# Patient Record
Sex: MALE | Race: WHITE | NOT HISPANIC OR LATINO | URBAN - METROPOLITAN AREA
[De-identification: names, ages, dates, MRNs, and addresses within clinical notes are randomized per-mention and may not be internally consistent; named-entity substitution may affect disease eponyms.]

---

## 2024-07-25 ENCOUNTER — TELEPHONE (OUTPATIENT)
Dept: SURGICAL ONCOLOGY | Facility: HOSPITAL | Age: 84
End: 2024-07-25

## 2024-07-25 DIAGNOSIS — I35.1 AORTIC VALVE INSUFFICIENCY, ETIOLOGY OF CARDIAC VALVE DISEASE UNSPECIFIED: ICD-10-CM

## 2024-07-25 DIAGNOSIS — I77.810 AORTIC ROOT DILATION (CMS-HCC): ICD-10-CM

## 2024-07-25 DIAGNOSIS — I77.819 DILATATION OF AORTA (CMS-HCC): ICD-10-CM

## 2024-08-12 ENCOUNTER — HOSPITAL ENCOUNTER (OUTPATIENT)
Dept: CARDIOLOGY | Facility: CLINIC | Age: 84
Discharge: HOME | End: 2024-08-12
Payer: MEDICARE

## 2024-08-12 DIAGNOSIS — I35.1 AORTIC VALVE INSUFFICIENCY, ETIOLOGY OF CARDIAC VALVE DISEASE UNSPECIFIED: ICD-10-CM

## 2024-08-12 DIAGNOSIS — I77.810 AORTIC ROOT DILATION (CMS-HCC): ICD-10-CM

## 2024-08-12 DIAGNOSIS — I77.819 DILATATION OF AORTA (CMS-HCC): ICD-10-CM

## 2024-08-12 PROCEDURE — 93306 TTE W/DOPPLER COMPLETE: CPT

## 2024-08-12 PROCEDURE — 93306 TTE W/DOPPLER COMPLETE: CPT | Performed by: INTERNAL MEDICINE

## 2024-08-13 LAB
AORTIC VALVE MEAN GRADIENT: 3 MMHG
AORTIC VALVE PEAK VELOCITY: 1.16 M/S
AV PEAK GRADIENT: 5.4 MMHG
AVA (PEAK VEL): 3.7 CM2
AVA (VTI): 3.83 CM2
EJECTION FRACTION APICAL 4 CHAMBER: 66.1
EJECTION FRACTION: 60 %
LEFT ATRIUM VOLUME AREA LENGTH INDEX BSA: 33.5 ML/M2
LEFT VENTRICLE INTERNAL DIMENSION DIASTOLE: 4.9 CM (ref 3.5–6)
LEFT VENTRICULAR OUTFLOW TRACT DIAMETER: 2.4 CM
MITRAL VALVE E/A RATIO: 0.99
RIGHT VENTRICLE FREE WALL PEAK S': 11.7 CM/S

## 2024-08-22 ENCOUNTER — TELEPHONE (OUTPATIENT)
Dept: CARDIAC SURGERY | Facility: HOSPITAL | Age: 84
End: 2024-08-22

## 2024-09-05 NOTE — TELEPHONE ENCOUNTER
Called 4x  to schedule , no answer and no vmail. Called Dr. Ford office and spoke with Monserrat about not being able to reach the patient.

## 2024-09-10 ENCOUNTER — TELEPHONE (OUTPATIENT)
Dept: CARDIAC SURGERY | Facility: HOSPITAL | Age: 84
End: 2024-09-10

## 2024-09-27 ENCOUNTER — APPOINTMENT (OUTPATIENT)
Dept: CARDIAC SURGERY | Facility: HOSPITAL | Age: 84
End: 2024-09-27
Payer: MEDICARE

## 2024-10-04 ENCOUNTER — APPOINTMENT (OUTPATIENT)
Dept: CARDIAC SURGERY | Facility: HOSPITAL | Age: 84
End: 2024-10-04
Payer: MEDICARE

## 2024-10-17 PROBLEM — D68.318 HEMORRHAGIC DISORDER DUE TO CIRCULATING ANTICOAGULANTS: Status: RESOLVED | Noted: 2024-10-17 | Resolved: 2024-10-17

## 2024-10-17 PROBLEM — I25.10 CAD IN NATIVE ARTERY: Status: ACTIVE | Noted: 2023-07-11

## 2024-10-17 PROBLEM — G47.33 OBSTRUCTIVE SLEEP APNEA SYNDROME IN ADULT: Status: ACTIVE | Noted: 2024-10-17

## 2024-10-17 PROBLEM — M51.16 LUMBAR DISC DISEASE WITH RADICULOPATHY: Status: RESOLVED | Noted: 2024-10-17 | Resolved: 2024-10-17

## 2024-10-17 PROBLEM — I87.2 PERIPHERAL VENOUS INSUFFICIENCY: Status: ACTIVE | Noted: 2024-10-17

## 2024-10-17 PROBLEM — C44.90 MALIGNANT NEOPLASM OF SKIN: Status: RESOLVED | Noted: 2024-10-17 | Resolved: 2024-10-17

## 2024-10-17 PROBLEM — R58 HEMORRHAGE: Status: ACTIVE | Noted: 2024-10-17

## 2024-10-17 PROBLEM — R09.02 HYPOXEMIA: Status: RESOLVED | Noted: 2023-07-11 | Resolved: 2024-10-17

## 2024-10-17 PROBLEM — S36.90XA: Status: RESOLVED | Noted: 2023-07-11 | Resolved: 2024-10-17

## 2024-10-17 PROBLEM — E66.01 MORBID OBESITY (MULTI): Status: ACTIVE | Noted: 2023-07-11

## 2024-10-17 PROBLEM — I71.21 ASCENDING AORTIC ANEURYSM (CMS-HCC): Status: RESOLVED | Noted: 2024-10-17 | Resolved: 2024-10-17

## 2024-10-17 PROBLEM — I89.0 LYMPHEDEMA: Status: ACTIVE | Noted: 2024-10-17

## 2024-10-17 PROBLEM — R31.9 HEMATURIA: Status: RESOLVED | Noted: 2022-07-09 | Resolved: 2024-10-17

## 2024-10-17 PROBLEM — E78.00 HYPERCHOLESTEROLEMIA: Status: ACTIVE | Noted: 2024-10-17

## 2024-10-17 PROBLEM — I27.21 PULMONARY ARTERIAL HYPERTENSION (MULTI): Status: ACTIVE | Noted: 2024-10-17

## 2024-10-17 PROBLEM — I11.9 HYPERTENSIVE HEART DISEASE: Status: RESOLVED | Noted: 2024-10-17 | Resolved: 2024-10-17

## 2024-10-17 PROBLEM — I10 HYPERTENSIVE DISORDER: Status: ACTIVE | Noted: 2024-10-17

## 2024-10-17 PROBLEM — R60.0 EDEMA OF LOWER EXTREMITY: Status: RESOLVED | Noted: 2024-10-17 | Resolved: 2024-10-17

## 2024-10-17 PROBLEM — S32.029A CLOSED FRACTURE OF SECOND LUMBAR VERTEBRA (MULTI): Status: RESOLVED | Noted: 2023-07-11 | Resolved: 2024-10-17

## 2024-10-17 PROBLEM — M54.9 BACK PAIN: Status: RESOLVED | Noted: 2022-07-09 | Resolved: 2024-10-17

## 2024-10-17 PROBLEM — G47.30 SLEEP APNEA: Status: ACTIVE | Noted: 2024-10-17

## 2024-10-17 PROBLEM — I48.91 ATRIAL FIBRILLATION (MULTI): Status: ACTIVE | Noted: 2023-07-11

## 2024-10-17 PROBLEM — D68.59 HYPERCOAGULABLE STATE (MULTI): Status: ACTIVE | Noted: 2024-10-17

## 2024-10-17 PROBLEM — M19.90 ARTHRITIS: Status: RESOLVED | Noted: 2024-10-17 | Resolved: 2024-10-17

## 2024-10-17 RX ORDER — TAMSULOSIN HYDROCHLORIDE 0.4 MG/1
1 CAPSULE ORAL
COMMUNITY
Start: 2023-10-28

## 2024-10-17 RX ORDER — NITROGLYCERIN 0.4 MG/1
0.4 TABLET SUBLINGUAL EVERY 5 MIN PRN
COMMUNITY
Start: 2024-06-28

## 2024-10-17 RX ORDER — CHLORTHALIDONE 25 MG/1
25 TABLET ORAL DAILY
COMMUNITY
Start: 2024-07-01

## 2024-10-17 RX ORDER — ENALAPRIL MALEATE 20 MG/1
20 TABLET ORAL DAILY
COMMUNITY
Start: 2024-06-27

## 2024-10-17 RX ORDER — METOPROLOL SUCCINATE 25 MG/1
25 TABLET, EXTENDED RELEASE ORAL DAILY
COMMUNITY

## 2024-10-17 RX ORDER — ATORVASTATIN CALCIUM 40 MG/1
40 TABLET, FILM COATED ORAL DAILY
COMMUNITY
Start: 2024-06-11

## 2024-10-17 RX ORDER — ALBUTEROL SULFATE 90 UG/1
2 INHALANT RESPIRATORY (INHALATION) EVERY 4 HOURS PRN
COMMUNITY
Start: 2024-03-07

## 2024-10-17 RX ORDER — AMLODIPINE BESYLATE 5 MG/1
5 TABLET ORAL DAILY
COMMUNITY
Start: 2024-07-01

## 2024-10-17 RX ORDER — APIXABAN 5 MG/1
10 TABLET, FILM COATED ORAL 2 TIMES DAILY
COMMUNITY

## 2024-10-17 RX ORDER — MONTELUKAST SODIUM 10 MG/1
10 TABLET ORAL NIGHTLY
COMMUNITY
Start: 2023-08-29

## 2024-10-17 RX ORDER — FLUTICASONE PROPIONATE 50 MCG
2 SPRAY, SUSPENSION (ML) NASAL DAILY
COMMUNITY
Start: 2024-04-15

## 2024-10-17 NOTE — PROGRESS NOTES
Referral from Dr. Espinosa. Roby comes to discuss treatment recommendations for dilated aorta with mild/moderate aortic regurgitation lvef 60%. Additional history of cad, hld, audie, htn, a fib s/p cardioversion  10/11/23,  on Eliquis, Walks with a cane, thyroid nodule, pulmonary nodule, venous insufficieny. Sonal Milan RN     Patient comes to clinic to discuss treatment options for aortic insufficiency, aortic root and ascending aortic aneurysm.  CT scan demonstrates aortic root of 5.3 cm with an ascending aorta of just under 5 cm.  Echocardiography demonstrates aortic insufficiency with preserved ventricular function.    Since aorta is greater than 5 cm, recommend median sternotomy, aortic root and ascending aortic replacement with a composite graft of a tissue aortic valve and dacryon graft.  Patient will need coronary angiography prior to surgery.  Patient also has a history of atrial fibrillation and will plan on left atrial appendage excision and ligation and pulmonary vein isolation procedure.  Risks, benefits, and alternatives discussed with patient, who wishes to proceed.

## 2024-10-25 ENCOUNTER — OFFICE VISIT (OUTPATIENT)
Dept: CARDIAC SURGERY | Facility: HOSPITAL | Age: 84
End: 2024-10-25
Payer: MEDICARE

## 2024-10-25 ENCOUNTER — HOSPITAL ENCOUNTER (OUTPATIENT)
Facility: HOSPITAL | Age: 84
Setting detail: SURGERY ADMIT
End: 2024-10-25
Attending: THORACIC SURGERY (CARDIOTHORACIC VASCULAR SURGERY) | Admitting: THORACIC SURGERY (CARDIOTHORACIC VASCULAR SURGERY)
Payer: MEDICARE

## 2024-10-25 VITALS
HEIGHT: 73 IN | DIASTOLIC BLOOD PRESSURE: 72 MMHG | SYSTOLIC BLOOD PRESSURE: 116 MMHG | BODY MASS INDEX: 38.25 KG/M2 | OXYGEN SATURATION: 96 % | WEIGHT: 288.6 LBS | HEART RATE: 88 BPM

## 2024-10-25 DIAGNOSIS — Q25.49 DILATATION OF AORTIC SINUS OF VALSALVA (HHS-HCC): Primary | ICD-10-CM

## 2024-10-25 PROCEDURE — 1036F TOBACCO NON-USER: CPT | Performed by: THORACIC SURGERY (CARDIOTHORACIC VASCULAR SURGERY)

## 2024-10-25 PROCEDURE — 1126F AMNT PAIN NOTED NONE PRSNT: CPT | Performed by: THORACIC SURGERY (CARDIOTHORACIC VASCULAR SURGERY)

## 2024-10-25 PROCEDURE — 99204 OFFICE O/P NEW MOD 45 MIN: CPT | Performed by: THORACIC SURGERY (CARDIOTHORACIC VASCULAR SURGERY)

## 2024-10-25 PROCEDURE — 3074F SYST BP LT 130 MM HG: CPT | Performed by: THORACIC SURGERY (CARDIOTHORACIC VASCULAR SURGERY)

## 2024-10-25 PROCEDURE — 99214 OFFICE O/P EST MOD 30 MIN: CPT | Performed by: THORACIC SURGERY (CARDIOTHORACIC VASCULAR SURGERY)

## 2024-10-25 PROCEDURE — 3078F DIAST BP <80 MM HG: CPT | Performed by: THORACIC SURGERY (CARDIOTHORACIC VASCULAR SURGERY)

## 2024-10-25 ASSESSMENT — ENCOUNTER SYMPTOMS
OCCASIONAL FEELINGS OF UNSTEADINESS: 0
LOSS OF SENSATION IN FEET: 0
DEPRESSION: 0

## 2024-10-25 ASSESSMENT — PAIN SCALES - GENERAL: PAINLEVEL_OUTOF10: 0-NO PAIN

## 2024-11-22 ENCOUNTER — HOSPITAL ENCOUNTER (OUTPATIENT)
Dept: CARDIOLOGY | Facility: HOSPITAL | Age: 84
Discharge: HOME | End: 2024-11-22
Payer: MEDICARE

## 2024-12-02 DIAGNOSIS — Z01.818 PRE-OP EVALUATION: ICD-10-CM

## 2024-12-10 ENCOUNTER — TELEPHONE (OUTPATIENT)
Dept: CARDIAC SURGERY | Facility: HOSPITAL | Age: 84
End: 2024-12-10
Payer: MEDICARE

## 2024-12-20 ENCOUNTER — TELEPHONE (OUTPATIENT)
Dept: CARDIAC SURGERY | Facility: HOSPITAL | Age: 84
End: 2024-12-20
Payer: MEDICARE